# Patient Record
(demographics unavailable — no encounter records)

---

## 2017-06-23 NOTE — ED.PDOC
General


ED Provider: 


Dr. KESHA CALVERT





Chief Complaint: Earache


Stated Complaint: BILATERAL EAR PAIN


Time Seen by Physician: 11:45


Mode of Arrival: Walk-In


Information Source: Patient


Exam Limitations: No limitations


Primary Care Provider: 


GLORIA GAITAN





Nursing and Triage Documentation Reviewed and Agree: Yes





EENT Complaint Exam





- Ear Complaint/Exam


Symptoms Are: Still present


Timing: Intermittent


Initial Severity: Moderate


Current Severity: Mild


Character: Reports: Dull pain


Aggravating: Reports: None


Alleviating: Reports: None


Associated Signs and Symptoms: Denies: Ear trauma, Ear swelling, Discharge, 

Fever, Hearing loss, Bleeding, Sore throat, Headache, URI symptoms, Foreign 

body sensation, Rash, Pain to external ear, Pain to external face


Related History: Reports: Similar Episode


Ear Surgical History: None


Vesicles to External Pinna: No


Vesicles to Tragus: No


TMJ Tenderness: None


Mastoid Tenderness: None


Tragal Tenderness: None


External Canal: Normal


Tympanic Membrane: Erythema


Differential Diagnoses: Otitis Media





Review of Systems





- Review Of Systems


Constitutional: Reports: No symptoms


Eyes: Reports: No symptoms


Ears, Nose, Mouth, Throat: Reports: Ear discharge (BILATERAL)


Respiratory: Reports: No symptoms


Cardiac: Reports: No symptoms


GI: Reports: No symptoms


: Reports: No symptoms


Musculoskeletal: Reports: No symptoms


Skin: Reports: No symptoms


Neurological: Reports: No symptoms


Endocrine: Reports: No symptoms


Hematologic/Lymphatic: Reports: No symptoms


All Other Systems: Reviewed and Negative





Past Medical History





- Past Medical History


Previously Healthy: Yes


Endocrine: Reports: None


Cardiovascular: Reports: Other (murmur)


Respiratory: Reports: None


Hematological: Reports: None


Gastrointestinal: Reports: None


Genitourinary: Reports: None


Neuro/Psych: Reports: None


Musculoskeletal: Reports: None


Cancer: Reports: None


Last Menstrual Period: last month





- Surgical History


General Surgical History: Reports: None





- Family History


Family History: Reports: None





- Social History


Smoking Status: Never smoker


Hx Substance Use: No


Alcohol Screening: None





- Immunizations


Tetanus Shot up to Date: Yes





Physical Exam





- Physical Exam


Appearance: Well-appearing, No pain distress, Well-nourished


Eyes: [place], EOMI, Conjunctiva clear


ENT: Erythema (BILATERAL EAR PAIN D/C )


Respiratory: Airway patent, Breath sounds clear, Breath sounds equal, 

Respirations nonlabored


Cardiovascular: RRR, Pulses normal, No rub, No murmur


GI/: Soft, Nontender, No masses, Bowel sounds normal, No Organomegaly


Musculoskeletal: Normal strength, ROM intact, No edema, No calf tenderness


Skin: Warm, Dry, Normal color


Neurological: Sensation intact, Motor intact, Reflexes intact, Cranial nerves 

intact, Alert, Oriented


Psychiatric: Affect appropriate, Mood appropriate





Critical Care Note





- Critical Care Note


Total Time (mins): 0





Course





- Course


Orders, Labs, Meds: 





Orders











 Category Date Time Status


 


 Ceftriaxone Sodium [Rocephin] MEDS  06/23/17 12:03 Stat





 1 gm IM ONCE STA   


 


 Lidocaine HCl/Pf [Lidocaine 1 % Amp 5 ml (Sutures)] MEDS  06/23/17 12:03 Stat





 2.1 ml IM ONCE STA   








Medications














Discontinued Medications














Generic Name Dose Route Start Last Admin





  Trade Name Aceq  PRN Reason Stop Dose Admin


 


Ceftriaxone Sodium  1 gm  06/23/17 12:03  





  Rocephin  IM  06/23/17 12:04  





  ONCE STA   


 


Lidocaine HCl  2.1 ml  06/23/17 12:03  





  Lidocaine 1 % Amp 5 Ml (Sutures)  IM  06/23/17 12:04  





  ONCE STA   











Vital Signs: 





 











  Temp Pulse Resp BP Pulse Ox


 


 06/23/17 11:36  98.2 F  83  20  129/82 H  96














Departure





- Departure


Time of Disposition: 12:08


Disposition: HOME SELF-CARE


Discharge Problem: 


 Bilateral chronic secretory otitis media





Instructions:  Otitis Media (ED), Serous Otitis Media (ED)


Condition: Good


Pt referred to PMD for follow-up: Yes


Additional Instructions: 


Please call your Family Physician as soon as possible to schedule a follow-up 

appointment.


Prescriptions: 


Neomycin/Polymyxin B/Hc Otic [Cortisporin Otic Susp] 4 drop OT Q8H #1 drops.susp


Allergies/Adverse Reactions: 


Allergies





No Known Allergies Allergy (Verified 06/23/17 11:47)


 








Home Medications: 


Ambulatory Orders





Neomycin/Polymyxin B/Hc Otic [Cortisporin Otic Susp] 4 drop OT Q8H #1 

drops.susp 06/23/17

## 2018-06-21 NOTE — US
EXAM:  Right upper quadrant abdominal ultrasound. 

  

History:  Right upper quadrant abdominal pain. 

  

Comparison:  CT abdomen pelvis 09/28/2016 

  

Technique:  Multiple sonographic images through the abdomen were obtained.  Color duplex Doppler was 
used to interrogate vascular flow. 

  

Findings: 

  

The liver is enlarged measuring 18 cm in length.  The liver is diffusely echogenic.  2 cm area of foc
al fatty sparing near the gallbladder fossa. No suspicious liver lesions.  No abdominal ascites.  No 
shadowing gallstones.  Gallbladder wall is not thickened.  Common bile duct measures 0.4 cm in calibe
r.  Visualized pancreas demonstrates no gross abnormality.  There is antegrade flow within the main p
ortal vein.  Limited visualization of the right kidney demonstrates no evidence for hydronephrosis. 

  

Impression:  Enlarged fatty liver.